# Patient Record
Sex: MALE | Race: AMERICAN INDIAN OR ALASKA NATIVE | NOT HISPANIC OR LATINO | Employment: UNEMPLOYED | URBAN - METROPOLITAN AREA
[De-identification: names, ages, dates, MRNs, and addresses within clinical notes are randomized per-mention and may not be internally consistent; named-entity substitution may affect disease eponyms.]

---

## 2023-04-07 ENCOUNTER — TRANSFERRED RECORDS (OUTPATIENT)
Dept: HEALTH INFORMATION MANAGEMENT | Facility: CLINIC | Age: 23
End: 2023-04-07

## 2023-04-07 LAB
ALT SERPL-CCNC: 63 U/L (ref 4–33)
AST SERPL-CCNC: 102 U/L (ref 13–39)
CREATININE (EXTERNAL): 0.9 MG/DL (ref 0.7–1.3)
GFR ESTIMATED (EXTERNAL): >=60 ML/MIN/1.73M2 (ref 60–130)
GLUCOSE (EXTERNAL): 114 MG/DL (ref 70–99)
PHQ9 SCORE: 27
POTASSIUM (EXTERNAL): 3.1 MMOL/L (ref 3.5–5.1)

## 2023-04-08 ENCOUNTER — HOSPITAL ENCOUNTER (INPATIENT)
Facility: HOSPITAL | Age: 23
LOS: 4 days | Discharge: HOME | End: 2023-04-12
Attending: STUDENT IN AN ORGANIZED HEALTH CARE EDUCATION/TRAINING PROGRAM | Admitting: STUDENT IN AN ORGANIZED HEALTH CARE EDUCATION/TRAINING PROGRAM
Payer: COMMERCIAL

## 2023-04-08 ENCOUNTER — TELEPHONE (OUTPATIENT)
Dept: PSYCHIATRY | Facility: OTHER | Age: 23
End: 2023-04-08

## 2023-04-08 DIAGNOSIS — F32.A DEPRESSION WITH SUICIDAL IDEATION: Primary | ICD-10-CM

## 2023-04-08 DIAGNOSIS — R45.851 DEPRESSION WITH SUICIDAL IDEATION: Primary | ICD-10-CM

## 2023-04-08 PROCEDURE — 250N000013 HC RX MED GY IP 250 OP 250 PS 637: Performed by: NURSE PRACTITIONER

## 2023-04-08 PROCEDURE — 124N000001 HC R&B MH

## 2023-04-08 PROCEDURE — HZ2ZZZZ DETOXIFICATION SERVICES FOR SUBSTANCE ABUSE TREATMENT: ICD-10-PCS | Performed by: NURSE PRACTITIONER

## 2023-04-08 RX ORDER — HALOPERIDOL 5 MG/ML
2.5-5 INJECTION INTRAMUSCULAR EVERY 6 HOURS PRN
Status: DISCONTINUED | OUTPATIENT
Start: 2023-04-08 | End: 2023-04-12 | Stop reason: HOSPADM

## 2023-04-08 RX ORDER — LORAZEPAM 2 MG/ML
1-2 INJECTION INTRAMUSCULAR EVERY 30 MIN PRN
Status: DISCONTINUED | OUTPATIENT
Start: 2023-04-08 | End: 2023-04-11

## 2023-04-08 RX ORDER — OLANZAPINE 5 MG/1
5-10 TABLET, ORALLY DISINTEGRATING ORAL EVERY 6 HOURS PRN
Status: DISCONTINUED | OUTPATIENT
Start: 2023-04-08 | End: 2023-04-12 | Stop reason: HOSPADM

## 2023-04-08 RX ORDER — MULTIPLE VITAMINS W/ MINERALS TAB 9MG-400MCG
1 TAB ORAL DAILY
Status: DISCONTINUED | OUTPATIENT
Start: 2023-04-08 | End: 2023-04-12 | Stop reason: HOSPADM

## 2023-04-08 RX ORDER — FLUMAZENIL 0.1 MG/ML
0.2 INJECTION, SOLUTION INTRAVENOUS
Status: DISCONTINUED | OUTPATIENT
Start: 2023-04-08 | End: 2023-04-12 | Stop reason: HOSPADM

## 2023-04-08 RX ORDER — GABAPENTIN 300 MG/1
300 CAPSULE ORAL EVERY 8 HOURS
Status: DISCONTINUED | OUTPATIENT
Start: 2023-04-14 | End: 2023-04-12 | Stop reason: HOSPADM

## 2023-04-08 RX ORDER — GABAPENTIN 600 MG/1
1200 TABLET ORAL ONCE
Status: COMPLETED | OUTPATIENT
Start: 2023-04-08 | End: 2023-04-08

## 2023-04-08 RX ORDER — GABAPENTIN 100 MG/1
100 CAPSULE ORAL EVERY 6 HOURS PRN
Status: DISCONTINUED | OUTPATIENT
Start: 2023-04-08 | End: 2023-04-12 | Stop reason: HOSPADM

## 2023-04-08 RX ORDER — GABAPENTIN 100 MG/1
100 CAPSULE ORAL EVERY 8 HOURS
Status: DISCONTINUED | OUTPATIENT
Start: 2023-04-16 | End: 2023-04-12 | Stop reason: HOSPADM

## 2023-04-08 RX ORDER — GABAPENTIN 300 MG/1
600 CAPSULE ORAL EVERY 8 HOURS
Status: DISCONTINUED | OUTPATIENT
Start: 2023-04-12 | End: 2023-04-12 | Stop reason: HOSPADM

## 2023-04-08 RX ORDER — MAGNESIUM HYDROXIDE/ALUMINUM HYDROXICE/SIMETHICONE 120; 1200; 1200 MG/30ML; MG/30ML; MG/30ML
30 SUSPENSION ORAL EVERY 4 HOURS PRN
Status: DISCONTINUED | OUTPATIENT
Start: 2023-04-08 | End: 2023-04-12 | Stop reason: HOSPADM

## 2023-04-08 RX ORDER — ACETAMINOPHEN 325 MG/1
650 TABLET ORAL EVERY 4 HOURS PRN
Status: DISCONTINUED | OUTPATIENT
Start: 2023-04-08 | End: 2023-04-12 | Stop reason: HOSPADM

## 2023-04-08 RX ORDER — CLONIDINE HYDROCHLORIDE 0.1 MG/1
0.1 TABLET ORAL EVERY 8 HOURS
Status: DISCONTINUED | OUTPATIENT
Start: 2023-04-08 | End: 2023-04-12 | Stop reason: HOSPADM

## 2023-04-08 RX ORDER — FOLIC ACID 1 MG/1
1 TABLET ORAL DAILY
Status: DISCONTINUED | OUTPATIENT
Start: 2023-04-08 | End: 2023-04-12 | Stop reason: HOSPADM

## 2023-04-08 RX ORDER — OLANZAPINE 10 MG/2ML
10 INJECTION, POWDER, FOR SOLUTION INTRAMUSCULAR 3 TIMES DAILY PRN
Status: DISCONTINUED | OUTPATIENT
Start: 2023-04-08 | End: 2023-04-12 | Stop reason: HOSPADM

## 2023-04-08 RX ORDER — GABAPENTIN 300 MG/1
900 CAPSULE ORAL EVERY 8 HOURS
Status: COMPLETED | OUTPATIENT
Start: 2023-04-09 | End: 2023-04-11

## 2023-04-08 RX ORDER — TRAZODONE HYDROCHLORIDE 50 MG/1
50 TABLET, FILM COATED ORAL
Status: DISCONTINUED | OUTPATIENT
Start: 2023-04-08 | End: 2023-04-12 | Stop reason: HOSPADM

## 2023-04-08 RX ORDER — OLANZAPINE 10 MG/1
10 TABLET ORAL 3 TIMES DAILY PRN
Status: DISCONTINUED | OUTPATIENT
Start: 2023-04-08 | End: 2023-04-12 | Stop reason: HOSPADM

## 2023-04-08 RX ORDER — LORAZEPAM 1 MG/1
1-2 TABLET ORAL EVERY 30 MIN PRN
Status: DISCONTINUED | OUTPATIENT
Start: 2023-04-08 | End: 2023-04-11

## 2023-04-08 RX ADMIN — CLONIDINE HYDROCHLORIDE 0.1 MG: 0.1 TABLET ORAL at 20:16

## 2023-04-08 RX ADMIN — GABAPENTIN 1200 MG: 600 TABLET ORAL at 20:16

## 2023-04-08 ASSESSMENT — ACTIVITIES OF DAILY LIVING (ADL)
CHANGE_IN_FUNCTIONAL_STATUS_SINCE_ONSET_OF_CURRENT_ILLNESS/INJURY: NO
DRESSING/BATHING_DIFFICULTY: NO
CONCENTRATING,_REMEMBERING_OR_MAKING_DECISIONS_DIFFICULTY: NO
DOING_ERRANDS_INDEPENDENTLY_DIFFICULTY: NO
DIFFICULTY_EATING/SWALLOWING: NO
ADLS_ACUITY_SCORE: 28
WEAR_GLASSES_OR_BLIND: NO
TOILETING_ISSUES: NO
FALL_HISTORY_WITHIN_LAST_SIX_MONTHS: NO
ADLS_ACUITY_SCORE: 28
WALKING_OR_CLIMBING_STAIRS_DIFFICULTY: NO
ADLS_ACUITY_SCORE: 45

## 2023-04-08 NOTE — TELEPHONE ENCOUNTER
1:58 PM - [facility] RN calling to report that Pt has been accepted to [facility].     1:59 PM - Indicia completed, Pt placed on PPS Admit Board

## 2023-04-08 NOTE — TELEPHONE ENCOUNTER
S: Outside Facility [place] [place], Charge IWONA Abarca calling at 1100.  22 year old/Male presenting with Suicidal ideation with plan    B: Pt arrived via police. Pt presents with Suicidal ideation with a plan to cut wrists and bleed to death in a bath.  Pt affect in ED: cooperative feeling sick  Pt Dx: Suicidal ETOH withdrawal  Previous IPMH hx? No  Pt endorses SI. Pt denies SIB.   Pt denies HI. Pt denies hallucinations.   Hx of suicide attempt? No  Hx of aggression, or current concerns for aggression this visit? No  Pt is prescribed medication. Pt is medication compliant  Pt denies OP services: yes  CD concerns: Yes ETOH  Acute medical concerns: Withdrawal  Does Pt present with any of the following: assistive devices, insulin pump, J/G tube, catheter, CPAP, continuous IV, continuous O2, bariatric needs, ADA needs? No  Is Pt their own guardian? Yes:   Pt is ambulatory  Pt is  able to perform ADLs independently    A: Pt meets criteria for review for IP admission. Patient on a 72-hour hold.   COVID: Negative  Utox: Negative  CMP: Abnormalities: Low K+  CBC: WNL  HCG: Negative    R: Patient accepted for behavioral bed placement: Yes        Accepted by Provider Miryam Rodriguez NP    Admission to Inpatient Level of Care is indicated due to:     1. Patient risk of severity of behavioral health disorder is appropriate to proposed level of care as indicated by:   a. Imminent risk of harm to self Yes describe Suicide with plan  b. Imminent risk of harm to others No describe   And/or  Behavioral health disorder is present and appropriate for inpatient care with both of the following:   a.  Severe psychiatric, behavioral or other comorbid conditions: No describe   b.  Severe dysfunction in daily living is present: No describe   2.  Inpatient mental health services are necessary to meet patient needs based on:   a. Specific condition related to admission diagnosis is present and will likely improve with treatment at an  inpatient level of care: Yes  b. Specific condition related to admission diagnosis will likely deteriorate in the absence of treatment at an inpatient level of care: Yes    3.  Situation and expectations are appropriate for inpatient care, as indicated by  one of the following:     A. Patient is unwilling to participate in treatment voluntarily and requires treatment. Yes   B. Is voluntary treatment at lower level of care feasible No  C. Around the clock medical and nursing care is for symptoms is required: Yes  D. Patient management at lower level of care is not appropriate and  biopsychosocial stressors may be contributing to clinical presentation. Yes

## 2023-04-09 PROCEDURE — 124N000001 HC R&B MH

## 2023-04-09 PROCEDURE — 99223 1ST HOSP IP/OBS HIGH 75: CPT | Mod: AI | Performed by: NURSE PRACTITIONER

## 2023-04-09 PROCEDURE — 250N000013 HC RX MED GY IP 250 OP 250 PS 637: Performed by: NURSE PRACTITIONER

## 2023-04-09 RX ORDER — VITAMIN B COMPLEX
50 TABLET ORAL DAILY
Status: DISCONTINUED | OUTPATIENT
Start: 2023-04-09 | End: 2023-04-12 | Stop reason: HOSPADM

## 2023-04-09 RX ORDER — LANOLIN ALCOHOL/MO/W.PET/CERES
3 CREAM (GRAM) TOPICAL
Status: DISCONTINUED | OUTPATIENT
Start: 2023-04-09 | End: 2023-04-12 | Stop reason: HOSPADM

## 2023-04-09 RX ORDER — CHLORAL HYDRATE 500 MG
1 CAPSULE ORAL DAILY
Status: DISCONTINUED | OUTPATIENT
Start: 2023-04-09 | End: 2023-04-12 | Stop reason: HOSPADM

## 2023-04-09 RX ADMIN — ACETAMINOPHEN 650 MG: 325 TABLET ORAL at 13:56

## 2023-04-09 RX ADMIN — LORAZEPAM 2 MG: 1 TABLET ORAL at 06:11

## 2023-04-09 RX ADMIN — GABAPENTIN 900 MG: 300 CAPSULE ORAL at 06:08

## 2023-04-09 RX ADMIN — Medication 100 MG: at 08:32

## 2023-04-09 RX ADMIN — CLONIDINE HYDROCHLORIDE 0.1 MG: 0.1 TABLET ORAL at 06:08

## 2023-04-09 RX ADMIN — CLONIDINE HYDROCHLORIDE 0.1 MG: 0.1 TABLET ORAL at 20:14

## 2023-04-09 RX ADMIN — GABAPENTIN 900 MG: 300 CAPSULE ORAL at 19:21

## 2023-04-09 RX ADMIN — FOLIC ACID 1 MG: 1 TABLET ORAL at 08:32

## 2023-04-09 RX ADMIN — GABAPENTIN 900 MG: 300 CAPSULE ORAL at 13:54

## 2023-04-09 RX ADMIN — LORAZEPAM 1 MG: 1 TABLET ORAL at 13:57

## 2023-04-09 RX ADMIN — CLONIDINE HYDROCHLORIDE 0.1 MG: 0.1 TABLET ORAL at 13:54

## 2023-04-09 RX ADMIN — MULTIPLE VITAMINS W/ MINERALS TAB 1 TABLET: TAB at 08:32

## 2023-04-09 ASSESSMENT — ACTIVITIES OF DAILY LIVING (ADL)
LAUNDRY: UNABLE TO COMPLETE
HYGIENE/GROOMING: INDEPENDENT
ADLS_ACUITY_SCORE: 28
LAUNDRY: UNABLE TO COMPLETE
ORAL_HYGIENE: INDEPENDENT
DRESS: SCRUBS (BEHAVIORAL HEALTH);INDEPENDENT
DRESS: INDEPENDENT;SCRUBS (BEHAVIORAL HEALTH)
ORAL_HYGIENE: INDEPENDENT
ADLS_ACUITY_SCORE: 28
HYGIENE/GROOMING: INDEPENDENT
ADLS_ACUITY_SCORE: 28

## 2023-04-09 NOTE — H&P
"[facility]   HISTORY AND PHYSICAL     ADMISSION DATA     Farhan Drake MRN# [id number]   Age: 22 year old YOB: 2000     Date of Admission: 4/8/2023  Primary Physician: No Ref-Primary, Physician        CHIEF COMPLAINT   \"I relapsed and was suicidal\"       HISTORY OF PRESENT ILLNESS    Farhan is a 21 yo  male who was admitted with MDD and suicidal ideation. Upon meeting with pt this am he reports he is no longer suicidal attributing this behavior to his relapse on ETOH. Pt notes having a 3 day binge, he had been sober since November. Discussed medications often used for decreased cravings such as Campral, naltrexone, or Vivitrol. Pt declines offer to start on any medications. Pt admits to feelings of depression which began in November. Discussed treatment for his mood which he again declined. I did remind him we are available if he would like to discuss medication. Pt desires to return to treatment.             PSYCHIATRIC HISTORY       Pt denies     SUBSTANCE USE HISTORY   History   Drug Use Not on file       Social History    Substance and Sexual Activity      Alcohol use: Not on file      History   Smoking Status     Not on file   Smokeless Tobacco     Not on file   Admits to ETOH abuse. Has been inpatient 3 times and outpatient 3 times.       SOCIAL HISTORY   Social History     Socioeconomic History     Marital status: Single     Spouse name: Not on file     Number of children: Not on file     Years of education: Not on file     Highest education level: Not on file   Occupational History     Not on file   Tobacco Use     Smoking status: Not on file     Smokeless tobacco: Not on file   Substance and Sexual Activity     Alcohol use: Not on file     Drug use: Not on file     Sexual activity: Not on file   Other Topics Concern     Not on file   Social History Narrative     Not on file     Social Determinants of Health     Financial Resource Strain: Not on file   Food " "Insecurity: Not on file   Transportation Needs: Not on file   Physical Activity: Not on file   Stress: Not on file   Social Connections: Not on file   Intimate Partner Violence: Not on file   Housing Stability: Not on file   single. Girlfriend broke up with him in November       FAMILY HISTORY   No family history on file.      PAST MEDICAL HISTORY   No past medical history on file.    No past surgical history on file.    Patient has no known allergies.     MEDICATIONS   Prior to Admission medications    Not on File        PHYSICAL EXAM/ROS     I have reviewed the physical exam as documented by Karla Mabry agree with findings and assessment and have no additional findings to add at this time. The review of systems is negative other than noted in the HPI.       LABS   No results found for this or any previous visit (from the past 24 hour(s)).      MENTAL STATUS EXAM   Vitals: /82   Pulse 76   Temp 97.6  F (36.4  C) (Temporal)   Resp 16   Ht 1.778 m (5' 10\")   Wt 108 kg (238 lb 1.6 oz)   SpO2 97%   BMI 34.16 kg/m      Appearance:  awake, alert, adequately groomed, dressed in hospital scrubs and appeared as age stated  Attitude:  cooperative  Eye Contact:  fair  Mood:  sad   Affect:  intensity is blunted  Speech:  clear, coherent  Psychomotor Behavior:  no evidence of tardive dyskinesia, dystonia, or tics and intact station, gait and muscle tone  Thought Process:  logical, linear and goal oriented  Associations:  no loose associations  Thought Content:  no evidence of suicidal ideation or homicidal ideation and no evidence of psychotic thought  Insight:  fair  Judgment:  fair  Oriented to:  time, person, and place  Attention Span and Concentration:  intact  Recent and Remote Memory:  intact  Language: Able to name objects, Able to repeat phrases and Able to read and write  Fund of Knowledge: appropriate  Muscle Strength and Tone: normal  Gait and Station: Normal       ASSESSMENT     This is a 22 " year old male with a PMH of alcohol use disorder-severe who was admitted for suicidal ideation following a 3 day binge after leaving treatment. Pt adamantly denies suicidal ideation today. He is not interested in treatment for his mood or craving for ETOH rather desires returning to treatment. Discussed it is unknown today if this is even a possibility however tomorrow his assigned SW maybe able to look into this for him.        DIAGNOSIS     1.alcohol use disorder, severe  2.suicidal ideation  3.major depression       PLAN     Location: Unit 5  Legal Status: Orders Placed This Encounter      Legal status 72 Hour Hold    Safety Assessment:    Behavioral Orders   Procedures     Code 1 - Restrict to Unit     Routine Programming     As clinically indicated     Status 15     Every 15 minutes.      PTA psychotropic medications held:   none    PTA psychotropic medications continued/changed:   none    New medications initiated:   May utilize standing orders as needed.    Programming: Patient will be treated in a therapeutic milieu with appropriate individual and group therapies. Education will be provided on diagnoses, medications, and treatments.     Medical diagnoses:  Per medicine    Consult: none  Tests: none    Anticipated LOS: 3-4 days  Disposition: to be determined=preferably CD treatment    Justification for hospitalization: reasons for hospitalization include potential safety risk to self or others within the last week, decreased functioning in outpatient setting and in the setting of no outpatient management, need for highly structured inpatient management for stabilization of psychiatric symptoms, need for psychiatric medication initiation and stabilization.       ATTESTATION      RAQUEL Mendenhall CNP
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
## 2023-04-09 NOTE — PLAN OF CARE
"SHIFT SUMMARY:  16:30 and 20:30 CIWA scores were below parameters for medication administration. Calm and cooperative, stating \"I need to get help. I want it.\" Denies suicidal ideation, hallucinations and pain. Eating at least 50% of meals. Compliant with medication administration and treatment team recommendations. Free of self-harm and/or injury.      Problem: Adult Behavioral Health Plan of Care  Goal: Patient-Specific Goal (Individualization)  Description: Patient will sleep 6 to 8 hours per night  Patient will eat at least 50% of meals  Patient will attend at least 50% of groups  Patient will comply with recommendations of treatment team  Patient will remain medication compliant  Outcome: Progressing     Problem: Suicide Risk  Goal: Absence of Self-Harm  Description: Pt will be free of self harm and injury while on unit.   Outcome: Progressing   Goal Outcome Evaluation:                        "

## 2023-04-09 NOTE — PLAN OF CARE
Problem: Adult Behavioral Health Plan of Care  Goal: Patient-Specific Goal (Individualization)  Description: Patient will sleep 6 to 8 hours per night  Patient will eat at least 50% of meals  Patient will attend at least 50% of groups  Patient will comply with recommendations of treatment team  Patient will remain medication compliant  Outcome: Progressing   Patient in bed sleeping until 0600.  Reported feeling withdrawal symptoms and scored a 15 on the CIWA scale.  Patient was given 2 mg Ativan at 0611.  Patient was back to sleep on recheck at 0650.  VS WNL.  Will continue to monitor.  Face to face end of shift report communicated to day shift RN.     Ellie Ellison, RN  4/9/2023  6:52 AM

## 2023-04-09 NOTE — PLAN OF CARE
Problem: Suicide Risk  Goal: Absence of Self-Harm  Description: Pt will be free of self harm and injury while on unit.   Intervention: Promote Psychosocial Wellbeing  Recent Flowsheet Documentation  Taken 4/9/2023 1300 by Tevin Alexander RN  Family/Support System Care:    support provided    involvement promoted    Pt feels depressed, guilty, and currently denies SI     Problem: Adult Behavioral Health Plan of Care  Goal: Patient-Specific Goal (Individualization)  Description: Patient will sleep 6 to 8 hours per night  Patient will eat at least 50% of meals  Patient will attend at least 50% of groups  Patient will comply with recommendations of treatment team  Patient will remain medication compliant  Outcome: Progressing   Goal Outcome Evaluation:    Plan of Care Reviewed With: patient      0730 Face to face rounding complete.  Pt introduced to nursing for the shift.    Pt was up for breakfast eating about 75%.  He had no withdrawal symptoms.  He went back to bed until lunch time. He did score a 15 after lunch and was given 1 mg of Ativan. He talked with me about his struggle with depression going on for about four years. He told me that Prozac made him feel like a Zombie in the past. He is interested in diet and exercise to help with his depression.  He was given some teaching on a low inflammation diet.  He asked about vitamin D and is interested in Fish oil.  This were started.  He told me that he has been struggling with sleep when he ws at home.  He is interested in going to a  based treatment program and was given information from the web about [employer] [facility] and [facility].  He is also interested in a [place] college and was given information on [place].  Pt is not interested in taking medications for his depression somewhat due to cultural considerations.  Pt's affect is sad, flat, anxious and depressed.   He told me that he is feeling ill and achy. He was also given Tylenol for generalized  body aches.      1500 Face to face end of shift report communicated to Evening Shift RN's along with Pt's fall risk..     Tevin Alexander RN  4/9/2023  3:13 PM

## 2023-04-09 NOTE — PROGRESS NOTES
04/08/23 2013   Patient Belongings   Did you bring any home meds/supplements to the hospital?  No   Patient Belongings locker;sent to security per site process   Patient Belongings Put in Hospital Secure Location (Security or Locker, etc.) cell phone/electronics;clothing;money (see comment);shoes;wallet  (Cash 100 (7))   Belongings Search Yes   Clothing Search Yes   Second Staff Ellie RN   Comment Yoon shoes, snacks, avera cup, white shoes, light green scrubs, x5 boxers, black hat, silver reflective jacket, black duffle, tan work boots, plastic bag w/ , tooth paste, tooth brush, x5 shirts, x7 pants, x2 sweaters, x4 pairs socks     List items sent to safe: grey Iphone in green case w/ some scratches, Black wallet, picture, [place] DL, [place] member ID, unlimited debit card, [employer] card, estebo, sole and munshower card, aver PT rights, cash (7) 100 dollar bills    All other belongings put in assigned cubby in belongings room.       I have reviewed my belongings list on admission and verify that it is correct.     Patient signature_______________________________    Second staff witness (if patient unable to sign) ______________________________       I have received all my belongings at discharge.    Patient signature________________________________    Saima  4/8/2023  8:17 PM

## 2023-04-10 PROCEDURE — 124N000001 HC R&B MH

## 2023-04-10 PROCEDURE — 250N000013 HC RX MED GY IP 250 OP 250 PS 637: Performed by: NURSE PRACTITIONER

## 2023-04-10 PROCEDURE — 99233 SBSQ HOSP IP/OBS HIGH 50: CPT | Mod: 95 | Performed by: PSYCHIATRY & NEUROLOGY

## 2023-04-10 RX ORDER — TRAZODONE HYDROCHLORIDE 50 MG/1
50 TABLET, FILM COATED ORAL
Status: DISCONTINUED | OUTPATIENT
Start: 2023-04-10 | End: 2023-04-12 | Stop reason: HOSPADM

## 2023-04-10 RX ADMIN — Medication 50 MCG: at 08:40

## 2023-04-10 RX ADMIN — ACETAMINOPHEN 650 MG: 325 TABLET ORAL at 11:12

## 2023-04-10 RX ADMIN — GABAPENTIN 900 MG: 300 CAPSULE ORAL at 04:02

## 2023-04-10 RX ADMIN — FOLIC ACID 1 MG: 1 TABLET ORAL at 08:40

## 2023-04-10 RX ADMIN — CLONIDINE HYDROCHLORIDE 0.1 MG: 0.1 TABLET ORAL at 04:02

## 2023-04-10 RX ADMIN — GABAPENTIN 900 MG: 300 CAPSULE ORAL at 18:32

## 2023-04-10 RX ADMIN — MULTIPLE VITAMINS W/ MINERALS TAB 1 TABLET: TAB at 08:40

## 2023-04-10 RX ADMIN — TRAZODONE HYDROCHLORIDE 50 MG: 50 TABLET ORAL at 21:47

## 2023-04-10 RX ADMIN — LORAZEPAM 1 MG: 1 TABLET ORAL at 11:12

## 2023-04-10 RX ADMIN — OMEGA-3 FATTY ACIDS CAP 1000 MG 1 G: 1000 CAP at 08:40

## 2023-04-10 RX ADMIN — GABAPENTIN 900 MG: 300 CAPSULE ORAL at 11:09

## 2023-04-10 RX ADMIN — MELATONIN 3 MG: at 21:47

## 2023-04-10 RX ADMIN — CLONIDINE HYDROCHLORIDE 0.1 MG: 0.1 TABLET ORAL at 21:47

## 2023-04-10 RX ADMIN — CLONIDINE HYDROCHLORIDE 0.1 MG: 0.1 TABLET ORAL at 12:51

## 2023-04-10 RX ADMIN — Medication 100 MG: at 08:40

## 2023-04-10 ASSESSMENT — ACTIVITIES OF DAILY LIVING (ADL)
HYGIENE/GROOMING: INDEPENDENT
ADLS_ACUITY_SCORE: 28
HYGIENE/GROOMING: INDEPENDENT
ORAL_HYGIENE: INDEPENDENT
DRESS: INDEPENDENT
DRESS: SCRUBS (BEHAVIORAL HEALTH);INDEPENDENT
ADLS_ACUITY_SCORE: 28
LAUNDRY: UNABLE TO COMPLETE
ORAL_HYGIENE: INDEPENDENT
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
## 2023-04-10 NOTE — PLAN OF CARE
"Face to face shift report received from Ellie POWER RN. Rounding completed, pt observed.    Problem: Adult Behavioral Health Plan of Care  Goal: Patient-Specific Goal (Individualization)  Description: Patient will sleep 6 to 8 hours per night  Patient will eat at least 50% of meals  Patient will attend at least 50% of groups  Patient will comply with recommendations of treatment team  Patient will remain medication compliant  Outcome: Progressing  Note: Shift Summary:   [facility] the start of this shift.  Patient denies current suicidal ideation or intent.  Appetite is \"okay\".  Compliant with medications.  Mild tremors noted at start of this shift.  CIWA-Ar scores this shift 2 at start of shift, 10 at 1100 so administered Ativan 1 mg po along with Tylenol 650 mg po at 1112 as complained of pain in right side of chest radiating down right arm.  VSS..Recheck of CIWA-Ar 3.  Pain in right arm/aside resolved.  Oriented x4,  Gait is balanced and steady.     Problem: Suicide Risk  Goal: Absence of Self-Harm  Description: Pt will be free of self harm and injury while on unit.   Outcome: Progressing     Problem: Alcohol Withdrawal  Goal: Alcohol Withdrawal Symptom Control  Description: Patient will have a safe withdrawal from alcohol.  Outcome: Progressing  Face to face report will be communicated to oncoming RN.    Jaimee Mondragon RN  4/10/2023                            "

## 2023-04-10 NOTE — DISCHARGE INSTRUCTIONS
Behavioral Discharge Planning and Instructions    Summary: 22 yr old  male who was admitted with MDD and SI with plan to cut wrists. 3 day binge on alcohol after being sober since November.    Main Diagnosis: 1.alcohol use disorder, severe  2.suicidal ideation  3.major depression    Health Care Follow-up:     [id number]  [address], [place] [address]  [phone]       [email or web address]  [address] [place] [address]    Attend all scheduled appointments with your outpatient providers. Call at least 24 hours in advance if you need to reschedule an appointment to ensure continued access to your outpatient providers.     Major Treatments, Procedures and Findings:  You were provided with: a psychiatric assessment, assessed for medical stability, medication evaluation and/or management, group therapy, family therapy, individual therapy, CD evaluation/assessment, milieu management, and medical interventions    Symptoms to Report: feeling more aggressive, increased confusion, losing more sleep, mood getting worse, or thoughts of suicide    Early warning signs can include: increased depression or anxiety sleep disturbances increased thoughts or behaviors of suicide or self-harm  increased unusual thinking, such as paranoia or hearing voices    Safety and Wellness:  Take all medicines as directed.  Make no changes unless your doctor suggests them.      Follow treatment recommendations.  Refrain from alcohol and non-prescribed drugs.  Ask your support system to help you reduce your access to items that could harm yourself or others. Items could include:  Firearms  Medicines (both prescribed and over-the-counter)  Knives and other sharp objects  Ropes and like materials  Car keys  If there is a concern for safety, call 911. If there is a concern for safety, call 911.    Resources:   Crisis Intervention: 935.435.3073 or 931-961-6186 (TTY: 131.999.8548).  " Call anytime for help.  National [place] on Mental Illness (www.[place].lindy.org): 802.410.5461 or 911-382-0515.  [place] Association for Children's Mental Health (www.mac.org): 663.125.5722.  Alcoholics Anonymous (www.alcoholics-anonymous.org): Check your phone book for your local chapter.  Suicide Awareness Voices of Education (SAVE) (www.save.org): 033-323-GYYD (0694)  National Suicide Prevention Line (www.mentalhealthmn.org): 122-981-RVOA (4218)  Mental Health Consumer/Survivor Network of [place] (www.mhcsn.net): 714.327.3080 or 404-585-9777  Mental Health Association of [place] (www.mentalhealth.org): 954.211.5193 or 000-466-4034  Self- Management and Recovery Training., SMART-- Toll free: 555.541.6864  www.[employer]  Text 4 Life: txt \"LIFE\" to 45179 for immediate support and crisis intervention  Crisis text line: Text \"[place]\" to 788335. Free, confidential, 24/7.  Crisis Intervention: 940.424.6468 or 319-447-1625. Call anytime for help.     General Medication Instructions:   See your medication sheet(s) for instructions.   Take all medicines as directed.  Make no changes unless your doctor suggests them.   Go to all your doctor visits.  Be sure to have all your required lab tests. This way, your medicines can be refilled on time.  Do not use any drugs not prescribed by your doctor.  Avoid alcohol.    Advance Directives:   Scanned document on file with [employer]? No scanned doc  Is document scanned? Pt states no documents  Honoring Choices Your Rights Handout: Informed and given  Was more information offered? Pt declined    The Treatment team has appreciated the opportunity to work with you. If you have any questions or concerns about your recent admission, you can contact the unit which can receive your call 24 hours a day, 7 days a week. They will be able to get in touch with a Provider if needed. The unit number is [phone] .  "

## 2023-04-10 NOTE — PROGRESS NOTES
"CLINICAL NUTRITION SERVICES  -  ASSESSMENT NOTE    Farhan Drake : Provider Order - alcohol withdrawal    22 yom admitted for depression with suicidal ideation. Limited medical hx available. No weight hx available. Nursing nutrition screen negative. Adequate intake so far. Multivitamin, thiamine and folic acid ordered - alcohol withdrawal.  Fish oil, and vitamin d also ordered- has not been given.    Diet Order: Regular  Intake: 4 meals with 100% intake    Height: 5' 10\"  Weight: 238 lbs 1.6 oz  Body mass index is 34.16 kg/m .  Weight Status:  Obesity Grade I BMI 30-34.9  Weight History:   Wt Readings from Last 10 Encounters:   04/08/23 108 kg (238 lb 1.6 oz)      Malnutrition Diagnosis: Unable to determine. None suspected at this time    NUTRITION RECOMMENDATIONS  Continue with current nutrition interventions- multivitamin, thiamine and folic acid    MONITORING AND EVALUATION:  RD will monitor intake, weight, labs as needed                "

## 2023-04-10 NOTE — PLAN OF CARE
Problem: Adult Behavioral Health Plan of Care  Goal: Patient-Specific Goal (Individualization)  Description: Patient will sleep 6 to 8 hours per night  Patient will eat at least 50% of meals  Patient will attend at least 50% of groups  Patient will comply with recommendations of treatment team  Patient will remain medication compliant  Outcome: Progressing       Patient in bed asleep all shift.  Woke him at 0400 for scheduled gabapentin and clonidine.  No signs of active withdrawal at this time Scored 0s on the CIWA scale during the night. Face to face end of shift report communicated to day shift RN.     Ellie Ellison RN  4/10/2023  6:26 AM

## 2023-04-11 PROCEDURE — 99232 SBSQ HOSP IP/OBS MODERATE 35: CPT | Mod: 95 | Performed by: PSYCHIATRY & NEUROLOGY

## 2023-04-11 PROCEDURE — 124N000001 HC R&B MH

## 2023-04-11 PROCEDURE — 250N000013 HC RX MED GY IP 250 OP 250 PS 637: Performed by: NURSE PRACTITIONER

## 2023-04-11 RX ORDER — TRAZODONE HYDROCHLORIDE 50 MG/1
50 TABLET, FILM COATED ORAL
Qty: 30 TABLET | Refills: 0 | Status: SHIPPED | OUTPATIENT
Start: 2023-04-11 | End: 2023-05-11

## 2023-04-11 RX ORDER — PROPRANOLOL HYDROCHLORIDE 20 MG/1
20 TABLET ORAL 3 TIMES DAILY PRN
COMMUNITY
Start: 2023-03-09

## 2023-04-11 RX ADMIN — CLONIDINE HYDROCHLORIDE 0.1 MG: 0.1 TABLET ORAL at 05:50

## 2023-04-11 RX ADMIN — GABAPENTIN 900 MG: 300 CAPSULE ORAL at 03:32

## 2023-04-11 RX ADMIN — OMEGA-3 FATTY ACIDS CAP 1000 MG 1 G: 1000 CAP at 08:38

## 2023-04-11 RX ADMIN — GABAPENTIN 900 MG: 300 CAPSULE ORAL at 19:24

## 2023-04-11 RX ADMIN — LORAZEPAM 1 MG: 1 TABLET ORAL at 08:38

## 2023-04-11 RX ADMIN — MULTIPLE VITAMINS W/ MINERALS TAB 1 TABLET: TAB at 08:38

## 2023-04-11 RX ADMIN — GABAPENTIN 900 MG: 300 CAPSULE ORAL at 11:26

## 2023-04-11 RX ADMIN — CLONIDINE HYDROCHLORIDE 0.1 MG: 0.1 TABLET ORAL at 13:14

## 2023-04-11 RX ADMIN — FOLIC ACID 1 MG: 1 TABLET ORAL at 08:38

## 2023-04-11 RX ADMIN — MELATONIN 3 MG: at 20:18

## 2023-04-11 RX ADMIN — Medication 100 MG: at 08:38

## 2023-04-11 RX ADMIN — TRAZODONE HYDROCHLORIDE 50 MG: 50 TABLET ORAL at 20:18

## 2023-04-11 RX ADMIN — Medication 50 MCG: at 08:38

## 2023-04-11 RX ADMIN — CLONIDINE HYDROCHLORIDE 0.1 MG: 0.1 TABLET ORAL at 20:18

## 2023-04-11 ASSESSMENT — ACTIVITIES OF DAILY LIVING (ADL)
ADLS_ACUITY_SCORE: 28
HYGIENE/GROOMING: INDEPENDENT
ADLS_ACUITY_SCORE: 28
DRESS: INDEPENDENT
ADLS_ACUITY_SCORE: 28
ORAL_HYGIENE: INDEPENDENT
ADLS_ACUITY_SCORE: 28

## 2023-04-11 NOTE — PLAN OF CARE
does not have insurance for ride and [place] bus line does not go to [place] from [place]. [employer] will provide ride at 8am 4/12.

## 2023-04-11 NOTE — PLAN OF CARE
SHIFT SUMMARY:  Scoring below parameters on CIWA scale for medication administration. Denies suicidal ideation, hallucinations and pain. Sociable with peers and staff. Free of self-harm. Eating at least 50% of meals. Compliant with recommendations of treatment team and medication administration.       Problem: Adult Behavioral Health Plan of Care  Goal: Patient-Specific Goal (Individualization)  Description: Patient will sleep 6 to 8 hours per night  Patient will eat at least 50% of meals  Patient will attend at least 50% of groups  Patient will comply with recommendations of treatment team  Patient will remain medication compliant  Outcome: Progressing     Problem: Suicide Risk  Goal: Absence of Self-Harm  Description: Pt will be free of self harm and injury while on unit.   Outcome: Progressing     Problem: Alcohol Withdrawal  Goal: Alcohol Withdrawal Symptom Control  Description: Patient will have a safe withdrawal from alcohol.  Outcome: Progressing     Problem: Suicidal Behavior  Goal: Suicidal Behavior is Absent or Managed  Outcome: Progressing   oal Outcome Evaluation:

## 2023-04-11 NOTE — PLAN OF CARE
"Face to face shift report received from Hanane DURAN RN. Rounding completed, pt observed.    Problem: Adult Behavioral Health Plan of Care  Goal: Patient-Specific Goal (Individualization)  Description: Patient will sleep 6 to 8 hours per night  Patient will eat at least 50% of meals  Patient will attend at least 50% of groups  Patient will comply with recommendations of treatment team  Patient will remain medication compliant  Outcome: Progressing  Note: Shift Summary:   Patient has been up on the unit this shift.  Denies current suicidal ideation or intent. Patient scored 8 on CIWA-Ar first thing this shift r/t mild tremors and anxiety.  Ativan 1 mg po administered at 0838.  Patient to groups today.  States he will be discharging back home tomorrow with plans to \"go to treatment once I get some things taken care of\".  Patient is steady on feet and can make his needs known to staff.     Problem: Suicide Risk  Goal: Absence of Self-Harm  Description: Pt will be free of self harm and injury while on unit.   Outcome: Progressing     Problem: Alcohol Withdrawal  Goal: Alcohol Withdrawal Symptom Control  Description: Patient will have a safe withdrawal from alcohol.  Outcome: Met  Face to face report will be communicated to oncoming RN.    Jaimee Mondragon RN  4/11/2023                      "

## 2023-04-11 NOTE — PROGRESS NOTES
"    [facility] PSYCHIATRY  -  PROGRESS NOTE     ID   Name: Farhan Drake  MRN#: [id number]     SUBJECTIVE   Prior to interviewing the patient, I met with nursing and reviewed patient's clinical condition. We discussed clinical care both before and after the interview. I have reviewed the patient's clinical course by review of records including previous notes, labs, and vital signs.     Per nursing, the patient had the following behavioral events over the last 24-hours: none    On psychiatric interview, he is met within the milieu. He states he is \"fine\". Reports he wants to go to treatment but not right away as he has \"business\" to take care of outside the hospital. Explained again this puts him at high risk for relapse. He denies SI. He denies physical pain. He is aware that his hold expires tomorrow. He did state he slept well with the trazodone last night and wishes to continue. Will plan to dismiss tomorrow after his hold is up.        MEDICATIONS   Scheduled Meds:    cloNIDine  0.1 mg Oral Q8H     fish oil-omega-3 fatty acids  1 g Oral Daily     folic acid  1 mg Oral Daily     [START ON 4/16/2023] gabapentin  100 mg Oral Q8H     [START ON 4/14/2023] gabapentin  300 mg Oral Q8H     [START ON 4/12/2023] gabapentin  600 mg Oral Q8H     gabapentin  900 mg Oral Q8H     multivitamin w/minerals  1 tablet Oral Daily     thiamine  100 mg Oral Daily     cholecalciferol  50 mcg Oral Daily     PRN Meds:.acetaminophen, alum & mag hydroxide-simethicone, flumazenil, gabapentin, OLANZapine zydis **OR** haloperidol lactate, melatonin, OLANZapine **OR** OLANZapine, traZODone, traZODone     ALLERGIES   No Known Allergies     VITALS   Vitals: /62   Pulse 62   Temp 97.6  F (36.4  C) (Temporal)   Resp 14   Ht 1.778 m (5' 10\")   Wt 108 kg (238 lb 1.6 oz)   SpO2 96%   BMI 34.16 kg/m       MENTAL STATUS EXAM   Farhan Drake is an age appearing 22 year old  male.  Grooming and hygiene are " "appropriate. Kinetics are calm, avoidant eye contact. Superficially Engaged in interview. Concentration is intact to conversation, cognition and intellectual functioning are intact. Mood is still \"fine\". Affect is subdued. Speech is within normal limits for volume, rate and tone. Thought process is logical, linear, and goal-directed. Does not endorse auditory/visual hallucinations and/or delusions. Does not appear to respond to internal stimuli.  Judgement and insight are poor.  Does not endorse suicidal ideation.  Does not endorse homicidal ideation. Gait and station are within normal limits.       LABS   No results found for this or any previous visit (from the past 24 hour(s)).      ASSESSMENT    Farhan is a 23 yo  male who was admitted with MDD and suicidal ideation. Upon meeting with pt this am he reports he is no longer suicidal attributing this behavior to his relapse on ETOH. Pt notes having a 3 day binge, he had been sober since November. Discussed medications often used for decreased cravings such as Campral, naltrexone, or Vivitrol. Pt declines offer to start on any medications. Pt admits to feelings of depression which began in November. Discussed treatment for his mood which he again declined. I did remind him we are available if he would like to discuss medication. Pt desires to return to treatment.    Daily Progress:  Continues with limited insight into what he will need for recovery. Will attempt to get him connected to CD treatment programs but ultimately unwilling to go in the near future. Slept well with trazodone last night and will continue. Anticipate dismissal at resolution of hold tomorrow.      DIAGNOSTIC FORMULATION      1.alcohol use disorder, severe  2.suicidal ideation  3.major depression     PLAN     Location: Unit 5  Legal Status: Orders Placed This Encounter      Legal status 72 Hour Hold    Safety Assessment:    Behavioral Orders   Procedures     Code 1 - Restrict to " Unit     Routine Programming     As clinically indicated     Status 15     Every 15 minutes.        PTA psychotropic medications held:   none     PTA psychotropic medications continued/changed:   none     New medications initiated:   May utilize standing orders as needed.    Today's Changes:  - continue trazodone 50 mg PRN at bedtime     Programming: Patient will be treated in a therapeutic milieu with appropriate individual and group therapies. Education will be provided on diagnoses, medications, and treatments. Encouraged behavioral activation and participation in group programming.     Medical diagnoses:  Per medicine    Disposition: pending clinical course        TREATMENT TEAM CARE PLAN     Progress: Continued symptoms.    Continued Stay Criteria/Rationale: Continued symptoms without sufficient improvement/resolution.    Medical/Physical: See above.    Precautions: See above.     Plan: Continue inpatient care with unit support and medication management.    Rationale for change in precautions or plan: NA due to no change.    Participants: Jae Patino MD, Nursing, SW, OT.    The patient's care was discussed with the treatment team and chart notes were reviewed.       ATTESTATION    Jae Patino MD  [facility]   Psychiatry    Video Visit: Patient has given verbal consent for video visit?: Yes  Type of Service: video visit for mental health treatment  Reason for Video Visit: COVID-19 [place]  Originating Site (patient location): [facility]  Distant Site (provider location): Remote Location  Mode of Communication: Video Conference via [employer]  Time of Service: Date: 04/11/2023 , Start: 09:00 end: 09:30

## 2023-04-11 NOTE — MEDICATION SCRIBE - ADMISSION MEDICATION HISTORY
Admission Medication History    Admission medication history is complete. The information provided in this note is only as accurate as the sources available at the time of the update.    Medication reconciliation/reorder completed by provider prior to medication history? yes    Information Source(s):     Patient interview    Medication dispense hx    Pertinent Information: none    Changes made to PTA medication list:    Added: propranolol    Deleted: none    Changed: none    Medication Affordability: did not review       Allergies reviewed with patient and updates made in EHR: NKDA    Medication History Completed By: Lindsey Kirkpatrick 4/11/2023 3:18 PM    PTA Med List   Medication Sig Last Dose     traZODone (DESYREL) 50 MG tablet Take 1 tablet (50 mg) by mouth nightly as needed for sleep (may repeat after 60 minutes)

## 2023-04-11 NOTE — PLAN OF CARE
Face to face report received from Cherelle BUSTILLO. Pt. Observed.     Problem: Adult Behavioral Health Plan of Care  Goal: Patient-Specific Goal (Individualization)  Description: Patient will sleep 6 to 8 hours per night  Patient will eat at least 50% of meals  Patient will attend at least 50% of groups  Patient will comply with recommendations of treatment team  Patient will remain medication compliant  Outcome: Progressing   Pt has been in bed with eyes closed and regular respirations x 6.75 hours this noc shift. 15 minute and PRN checks all night. No complaints offered. Will continue to monitor.    Problem: Alcohol Withdrawal  Goal: Alcohol Withdrawal Symptom Control  Description: Patient will have a safe withdrawal from alcohol.  Outcome: Progressing     Face to face end of shift report communicated to oncoming RN.     Hanane Kaminski RN  4/11/2023

## 2023-04-11 NOTE — DISCHARGE SUMMARY
[facility] PSYCHIATRY  DISCHARGE SUMMARY     DISCHARGE DATA     Farhan Drake MRN# [id number]   Age: 22 year old YOB: 2000     Date of Admission: 4/8/2023  Date of Discharge: April 12, 2023  Discharge Provider: Jae Patino MD       REASON FOR ADMISSION   Farhan is a 21 yo  male who was admitted with MDD and suicidal ideation. Upon meeting with pt this am he reports he is no longer suicidal attributing this behavior to his relapse on ETOH. Pt notes having a 3 day binge, he had been sober since November. Discussed medications often used for decreased cravings such as Campral, naltrexone, or Vivitrol. Pt declines offer to start on any medications. Pt admits to feelings of depression which began in November. Discussed treatment for his mood which he again declined. I did remind him we are available if he would like to discuss medication. Pt desires to return to treatment.       DISCHARGE DIAGNOSES   1.alcohol use disorder, severe  2.suicidal ideation  3.major depression     HOSPITAL COURSE   Patient was admitted to unit 5 due to the aforementioned presentation. The patient was placed under 15 minute checks to ensure patient safety. The patient participated in unit programming and groups as able.    Legal status during hospitalization was involuntary .Mr. Drake did not require seclusion/restraint during hospitalization.   He was dismissed at the resolution of his hold. Ultimately it was recommended that Mr. Drake transition to CD treatment as he has continued to ortiz alcohol use disorder since he was 14 years old age and his risk of relapse is high. When he does relapse, he gets worsening suicidal ideation and plans to kill himself. Ultimately, he wished to depart the hospital at the resolution of his hold. Additional days were offered to him, however he declined. He does not meet criteria to file for MICD commitment and thus he was discharged at the resolution of his  hold.  He was started on trazodone for sleep restoration which he found helpful and a 30-day prescritpion supply was sent with him upon dismissal. He received a plethora of CD resources during hospitalization and worked with social work closely for referrals in the future if needed.     We reviewed with Mr. Drake current and past medication trials including duration, dose, response and side effects. During this hospitalization, the following changes to the patient's psychotropic medications were made:     PTA psychotropic medications held:   none     PTA psychotropic medications continued/changed:   none     New medications initiated:   Trazodone 50 mg at bedtime  Recommended he start an selective serotonin reuptake inhibitor but he was unwilling and preferred to not.     Mr. Drake progressed gradually related to establishment of a supportive community network and abstaining from further substance use . By the time of discharge, his symptoms had improved somewhat.  Substance cravings improved with increased confidence in self-managing sobriety., Depression improved with increased confidence in ability to manage symptoms. and Suicidal ideation resolved with adequate outpatient plan in place.  The treatment goals (long-term goal/discharge criteria) were reached.     With the aforementioned changes and supports the patient noticed improvement in their symptoms and felt sufficiently ready for discharge. As a result, Farhna Drake was discharged. At the time of discharge, Farhan Drake was determined to not be a danger to self or others. The patient was also medically stable for discharge. At the current time of discharge, the patient does not meet criteria for involuntary hospitalization. On the day of discharge, the patient reports that they do not have suicidal or homicidal ideation. Steps taken to minimize risk include: assessing patient s behavior and thought process daily during hospital stay,  "discharging patient with adequate plan for follow up for mental and physical health and discussing safety plan of returning to the hospital should the patient ever have thoughts of harming themselves or others. Therefore, based on all available evidence including the factors cited above, the patient does not appear to be at imminent risk for self-harm, and is appropriate for outpatient level of care. The acute crisis is resolved and Farhan is discharging in improved condition. Though Farhan's acute crisis has resolved, there remains a higher risk of suicide over the long term compared to the general population. Factors that may be associated with relapse include worsening of depressive symptoms, alcohol use, illicit substance use, not taking medications, lack of mental health follow-up appointments and work/family/relationship stressors. Farhan Drake has a plan in place to mitigate these stressors, is hopeful about the future ,and is not assessed to be a imminent risk to self or anyone else and thus is not assessed to be holdable.  Farhan has received maximal benefit from the current hospital stay. Farhan Drake set to discharge.        DISCHARGE MEDICATIONS     Current Discharge Medication List      START taking these medications    Details   traZODone (DESYREL) 50 MG tablet Take 1 tablet (50 mg) by mouth nightly as needed for sleep (may repeat after 60 minutes)  Qty: 30 tablet, Refills: 0    Associated Diagnoses: Depression with suicidal ideation                VITALS   Vitals: /62   Pulse 62   Temp 97.6  F (36.4  C) (Temporal)   Resp 14   Ht 1.778 m (5' 10\")   Wt 108 kg (238 lb 1.6 oz)   SpO2 96%   BMI 34.16 kg/m       MENTAL STATUS EXAM   Appearance: Alert, oriented, dressed in hospital scrubs, appears stated age   Attitude: Cooperative   Eye Contact: Good  Mood: \"Better\"  Affect: Full range of affect  Speech: Normal rate and rhythm   Psychomotor Behavior: No tremor, rigidity, or " psychomotor abnormality   Thought Process: Logical, goal directed   Associations: No loose associations   Thought Content: Denies SI or plan. No SIB. Denies A/V hallucinations. No evidence of delusional thought.  Insight: Good  Judgment: Good  Oriented to: Person, place, and time  Attention Span and Concentration: Intact  Recent and Remote Memory: Intact  Language: English with appropriate syntax and vocabulary  Fund of Knowledge: Average  Muscle Strength and Tone: Grossly normal  Gait and Station: Grossly normal       DISCHARGE PLAN     1.  Education given regarding diagnostic and treatment options with risks, benefits and alternatives with adequate verbalization of understanding.  2.  Discharge to home. Upon detailed review of risk factors, patient amenable for release.   3.  Continue aforementioned medications and associated medication changes with follow-up by outpatient provider.  4.  Crisis management planning in place.    5.  Nursing and  to review further discharge recommendations.   6.  Active issues: No active medical issues requiring immediate follow-up care.  7.  Patient is being discharged with the following appointments as detailed below:    See AVS  Highly encouraged to enter CD treatment        DISCHARGE SERVICES PROVIDED     80 minutes spent on discharge services, including:  Final examination of patient.  Review and discussion of hospital stay.  Instructions for continued outpatient care/goals.  Preparation of discharge records.  Preparation of medications refills and new prescriptions.  Preparation of applicable referral forms.        ATTESTATION   Jae Patino MD  [facility]   Psychiatry    Video Visit: Patient has given verbal consent for video visit?: Yes  Type of Service: video visit for mental health treatment  Reason for Video Visit: COVID-19 [place]  Originating Site (patient location): [facility]  Distant Site (provider location):  Remote Location  Mode of Communication: Video Conference via [employer]  Time of Service: Date: April 12, 2023 , Start: 07:00 end: 08:00     LABS THIS ADMISSION     No results found for any visits on 04/08/23.

## 2023-04-12 VITALS
RESPIRATION RATE: 18 BRPM | SYSTOLIC BLOOD PRESSURE: 118 MMHG | HEART RATE: 73 BPM | DIASTOLIC BLOOD PRESSURE: 81 MMHG | TEMPERATURE: 98.6 F | WEIGHT: 238.1 LBS | OXYGEN SATURATION: 98 % | BODY MASS INDEX: 34.09 KG/M2 | HEIGHT: 70 IN

## 2023-04-12 PROCEDURE — 250N000013 HC RX MED GY IP 250 OP 250 PS 637: Performed by: NURSE PRACTITIONER

## 2023-04-12 PROCEDURE — 99239 HOSP IP/OBS DSCHRG MGMT >30: CPT | Mod: 95 | Performed by: PSYCHIATRY & NEUROLOGY

## 2023-04-12 RX ADMIN — GABAPENTIN 600 MG: 300 CAPSULE ORAL at 04:06

## 2023-04-12 RX ADMIN — CLONIDINE HYDROCHLORIDE 0.1 MG: 0.1 TABLET ORAL at 04:06

## 2023-04-12 ASSESSMENT — ACTIVITIES OF DAILY LIVING (ADL)
ADLS_ACUITY_SCORE: 28

## 2023-04-12 NOTE — PLAN OF CARE
Face to face report received from Cherelle BUSTILLO. Pt. Observed.     Problem: Adult Behavioral Health Plan of Care  Goal: Patient-Specific Goal (Individualization)  Description: Patient will sleep 6 to 8 hours per night  Patient will eat at least 50% of meals  Patient will attend at least 50% of groups  Patient will comply with recommendations of treatment team  Patient will remain medication compliant  Outcome: Progressing  Pt has been in bed with eyes closed and regular respirations for a total of 5 hours this noc shift. Pt. Reporting he is unable to fall back to sleep after being woke to take medications. Pt. Refusing morning scheduled medications. 15 minute and PRN checks all night. Will continue to monitor.     Face to face end of shift report communicated to alphonso BUSTILLO.     Hanane Kaminski RN  4/12/2023

## 2023-04-12 NOTE — PLAN OF CARE
"Face to face end of shift report communicated to Arturo TRIPATHI RN.     Lady Andres RN  4/11/2023  9:01 PM       Problem: Adult Behavioral Health Plan of Care  Goal: Patient-Specific Goal (Individualization)  Description: Patient will sleep 6 to 8 hours per night  Patient will eat at least 50% of meals  Patient will attend at least 50% of groups  Patient will comply with recommendations of treatment team  Patient will remain medication compliant  Outcome: Progressing  Note: Patient up on unit spending most of his time in lounge.  Patient polite and cooperative with nursing cares and assessment.    Patient lets needs be known.   PRN:  melatonin 3 mg and trazodone 50 mg @ 2018 for sleep.  Patient happy to be leaving facility, talks about how he needs to go home first to take care of bills and get things in order, endorses the plan to go to treatment.  \"I think if I work with treatment it will actually help me if they take me there\"    Patient does not attend groups this shift.   Patient denies SI/HI, AH/VH, depression and anxiety.      Goal Outcome Evaluation:                        "

## 2023-04-12 NOTE — PLAN OF CARE
Face to face shift report received from Hanane DURAN RN. Rounding completed, pt observed.    Problem: Adult Behavioral Health Plan of Care  Goal: Plan of Care Review  Outcome: Adequate for Care Transition  Note: Shift Summary:  Discharge Note    Patient Discharged to home on 4/12/2023 8:29 AM [facility] [employer] accompanied by .     Patient informed of discharge instructions in AVS. patient verbalizes understanding and denies having any questions pertaining to AVS. Patient stable at time of discharge. Patient denies SI, HI, and thoughts of self harm at time of discharge. All personal belongings returned to patient. Discharge prescriptions sent to [facility] but refuses to pick them up via electronic communication. Psych evaluation, history and physical, AVS, and discharge summary faxed to next level of care- .     Jaimee Mondragon RN  4/12/2023  8:29 AM      Goal: Patient-Specific Goal (Individualization)  Description: Patient will sleep 6 to 8 hours per night  Patient will eat at least 50% of meals  Patient will attend at least 50% of groups  Patient will comply with recommendations of treatment team  Patient will remain medication compliant  Outcome: Adequate for Care Transition  Goal: Individualized Daily Interaction Plan (IDIP)  Outcome: Adequate for Care Transition  Goal: Adheres to Safety Considerations for Self and Others  Outcome: Adequate for Care Transition  Goal: Absence of New-Onset Illness or Injury  Outcome: Adequate for Care Transition  Goal: Optimized Coping Skills in Response to Life Stressors  Outcome: Adequate for Care Transition  Goal: Develops/Participates in Therapeutic [place] to Support Successful Transition  Outcome: Adequate for Care Transition     Problem: Suicide Risk  Goal: Absence of Self-Harm  Description: Pt will be free of self harm and injury while on unit.   Outcome: Adequate for Care Transition     Problem: Alcohol Withdrawal  Goal: Alcohol Withdrawal Symptom Control  Description:  Patient will have a safe withdrawal from alcohol.  Outcome: Adequate for Care Transition  Goal: Optimal Neurologic Function  Outcome: Adequate for Care Transition  Goal: Readiness for Change Identified  Outcome: Adequate for Care Transition     Problem: Suicidal Behavior  Goal: Suicidal Behavior is Absent or Managed  Outcome: Adequate for Care Transition   Goal Outcome Evaluation:    Plan of Care Reviewed With: patient

## 2023-04-12 NOTE — PLAN OF CARE
Pt is discharging at the recommendation of the treatment team. Pt is discharging to home transported by [employer]. Pt denies having any thoughts of hurting themself or anyone else. Pt denies anxiety or depression. Pt has follow up with [facility]. Discharge instructions, including; demographic sheet, psychiatric evaluation, discharge summary, and AVS were faxed to these next level of care providers.

## 2023-04-18 ENCOUNTER — TELEPHONE (OUTPATIENT)
Dept: BEHAVIORAL HEALTH | Facility: HOSPITAL | Age: 23
End: 2023-04-18

## 2023-04-18 NOTE — TELEPHONE ENCOUNTER
Farhan was discharged to home on  4/12/23 from [facility]. Attempted to call Farhan to follow up ensuring all discharge questions were answered.  No answer.  Left voicemail with call back number.    Prescriptions were e-scribed at discharge and were able to be filled. No  If unable to obtain prescriptions: explain:  Farhan had medications filled at [facility], though refused to pick them up.

## 2024-04-09 ENCOUNTER — TRANSFERRED RECORDS (OUTPATIENT)
Dept: HEALTH INFORMATION MANAGEMENT | Facility: CLINIC | Age: 24
End: 2024-04-09
Payer: COMMERCIAL

## 2024-04-12 ENCOUNTER — HOSPITAL ENCOUNTER (EMERGENCY)
Facility: CLINIC | Age: 24
Discharge: HOME | End: 2024-04-12
Attending: EMERGENCY MEDICINE | Admitting: EMERGENCY MEDICINE
Payer: COMMERCIAL

## 2024-04-12 ENCOUNTER — APPOINTMENT (OUTPATIENT)
Dept: CT IMAGING | Facility: CLINIC | Age: 24
End: 2024-04-12
Attending: EMERGENCY MEDICINE
Payer: COMMERCIAL

## 2024-04-12 VITALS
SYSTOLIC BLOOD PRESSURE: 144 MMHG | RESPIRATION RATE: 20 BRPM | WEIGHT: 230 LBS | DIASTOLIC BLOOD PRESSURE: 110 MMHG | TEMPERATURE: 98.3 F | OXYGEN SATURATION: 96 % | HEART RATE: 62 BPM | BODY MASS INDEX: 32.93 KG/M2 | HEIGHT: 70 IN

## 2024-04-12 DIAGNOSIS — R07.9 CHEST PAIN, UNSPECIFIED TYPE: ICD-10-CM

## 2024-04-12 DIAGNOSIS — Z87.898 HISTORY OF ALCOHOL USE DISORDER: ICD-10-CM

## 2024-04-12 DIAGNOSIS — Z87.898 HISTORY OF SUBSTANCE USE DISORDER: ICD-10-CM

## 2024-04-12 LAB
ANION GAP SERPL CALCULATED.3IONS-SCNC: 13 MMOL/L (ref 7–15)
BASOPHILS # BLD AUTO: 0.1 10E3/UL (ref 0–0.2)
BASOPHILS NFR BLD AUTO: 1 %
BUN SERPL-MCNC: 12.2 MG/DL (ref 6–20)
CALCIUM SERPL-MCNC: 8.9 MG/DL (ref 8.6–10)
CHLORIDE SERPL-SCNC: 98 MMOL/L (ref 98–107)
CREAT SERPL-MCNC: 0.88 MG/DL (ref 0.67–1.17)
D DIMER PPP FEU-MCNC: 0.52 UG/ML FEU (ref 0–0.5)
DEPRECATED HCO3 PLAS-SCNC: 23 MMOL/L (ref 22–29)
EGFRCR SERPLBLD CKD-EPI 2021: >90 ML/MIN/1.73M2
EOSINOPHIL # BLD AUTO: 0.4 10E3/UL (ref 0–0.7)
EOSINOPHIL NFR BLD AUTO: 5 %
ERYTHROCYTE [DISTWIDTH] IN BLOOD BY AUTOMATED COUNT: 16.3 % (ref 10–15)
GLUCOSE SERPL-MCNC: 94 MG/DL (ref 70–99)
HCT VFR BLD AUTO: 47.1 % (ref 40–53)
HGB BLD-MCNC: 15.7 G/DL (ref 13.3–17.7)
HOLD SPECIMEN: NORMAL
IMM GRANULOCYTES # BLD: 0 10E3/UL
IMM GRANULOCYTES NFR BLD: 0 %
LYMPHOCYTES # BLD AUTO: 1.7 10E3/UL (ref 0.8–5.3)
LYMPHOCYTES NFR BLD AUTO: 20 %
MCH RBC QN AUTO: 29.1 PG (ref 26.5–33)
MCHC RBC AUTO-ENTMCNC: 33.3 G/DL (ref 31.5–36.5)
MCV RBC AUTO: 87 FL (ref 78–100)
MONOCYTES # BLD AUTO: 0.6 10E3/UL (ref 0–1.3)
MONOCYTES NFR BLD AUTO: 7 %
NEUTROPHILS # BLD AUTO: 5.5 10E3/UL (ref 1.6–8.3)
NEUTROPHILS NFR BLD AUTO: 66 %
NRBC # BLD AUTO: 0 10E3/UL
NRBC BLD AUTO-RTO: 0 /100
PLATELET # BLD AUTO: 208 10E3/UL (ref 150–450)
POTASSIUM SERPL-SCNC: 4 MMOL/L (ref 3.4–5.3)
RBC # BLD AUTO: 5.39 10E6/UL (ref 4.4–5.9)
SODIUM SERPL-SCNC: 134 MMOL/L (ref 135–145)
TROPONIN T SERPL HS-MCNC: 9 NG/L
WBC # BLD AUTO: 8.3 10E3/UL (ref 4–11)

## 2024-04-12 PROCEDURE — 99285 EMERGENCY DEPT VISIT HI MDM: CPT | Mod: 25 | Performed by: EMERGENCY MEDICINE

## 2024-04-12 PROCEDURE — 99284 EMERGENCY DEPT VISIT MOD MDM: CPT | Mod: 25 | Performed by: EMERGENCY MEDICINE

## 2024-04-12 PROCEDURE — 93308 TTE F-UP OR LMTD: CPT | Mod: TC | Performed by: EMERGENCY MEDICINE

## 2024-04-12 PROCEDURE — 82374 ASSAY BLOOD CARBON DIOXIDE: CPT | Performed by: EMERGENCY MEDICINE

## 2024-04-12 PROCEDURE — 250N000011 HC RX IP 250 OP 636: Performed by: EMERGENCY MEDICINE

## 2024-04-12 PROCEDURE — 85379 FIBRIN DEGRADATION QUANT: CPT | Performed by: EMERGENCY MEDICINE

## 2024-04-12 PROCEDURE — 93005 ELECTROCARDIOGRAM TRACING: CPT | Performed by: EMERGENCY MEDICINE

## 2024-04-12 PROCEDURE — 36415 COLL VENOUS BLD VENIPUNCTURE: CPT | Performed by: EMERGENCY MEDICINE

## 2024-04-12 PROCEDURE — 96374 THER/PROPH/DIAG INJ IV PUSH: CPT | Mod: 59 | Performed by: EMERGENCY MEDICINE

## 2024-04-12 PROCEDURE — 96361 HYDRATE IV INFUSION ADD-ON: CPT | Performed by: EMERGENCY MEDICINE

## 2024-04-12 PROCEDURE — 250N000009 HC RX 250: Performed by: EMERGENCY MEDICINE

## 2024-04-12 PROCEDURE — 84484 ASSAY OF TROPONIN QUANT: CPT | Performed by: EMERGENCY MEDICINE

## 2024-04-12 PROCEDURE — 85041 AUTOMATED RBC COUNT: CPT | Performed by: EMERGENCY MEDICINE

## 2024-04-12 PROCEDURE — 93010 ELECTROCARDIOGRAM REPORT: CPT | Performed by: EMERGENCY MEDICINE

## 2024-04-12 PROCEDURE — 71275 CT ANGIOGRAPHY CHEST: CPT

## 2024-04-12 PROCEDURE — 93308 TTE F-UP OR LMTD: CPT | Mod: 26 | Performed by: EMERGENCY MEDICINE

## 2024-04-12 PROCEDURE — 258N000003 HC RX IP 258 OP 636: Performed by: EMERGENCY MEDICINE

## 2024-04-12 RX ORDER — KETOROLAC TROMETHAMINE 15 MG/ML
10 INJECTION, SOLUTION INTRAMUSCULAR; INTRAVENOUS
Status: COMPLETED | OUTPATIENT
Start: 2024-04-12 | End: 2024-04-12

## 2024-04-12 RX ORDER — SODIUM CHLORIDE, SODIUM LACTATE, POTASSIUM CHLORIDE, CALCIUM CHLORIDE 600; 310; 30; 20 MG/100ML; MG/100ML; MG/100ML; MG/100ML
1000 INJECTION, SOLUTION INTRAVENOUS CONTINUOUS
Status: DISCONTINUED | OUTPATIENT
Start: 2024-04-12 | End: 2024-04-12 | Stop reason: HOSPADM

## 2024-04-12 RX ORDER — IOPAMIDOL 755 MG/ML
91 INJECTION, SOLUTION INTRAVASCULAR ONCE
Status: COMPLETED | OUTPATIENT
Start: 2024-04-12 | End: 2024-04-12

## 2024-04-12 RX ADMIN — SODIUM CHLORIDE 100 ML: 9 INJECTION, SOLUTION INTRAVENOUS at 16:48

## 2024-04-12 RX ADMIN — SODIUM CHLORIDE, POTASSIUM CHLORIDE, SODIUM LACTATE AND CALCIUM CHLORIDE 1000 ML: 600; 310; 30; 20 INJECTION, SOLUTION INTRAVENOUS at 14:47

## 2024-04-12 RX ADMIN — IOPAMIDOL 91 ML: 755 INJECTION, SOLUTION INTRAVENOUS at 16:48

## 2024-04-12 RX ADMIN — KETOROLAC TROMETHAMINE 10 MG: 15 INJECTION, SOLUTION INTRAMUSCULAR; INTRAVENOUS at 14:46

## 2024-04-12 ASSESSMENT — COLUMBIA-SUICIDE SEVERITY RATING SCALE - C-SSRS
2. HAVE YOU ACTUALLY HAD ANY THOUGHTS OF KILLING YOURSELF IN THE PAST MONTH?: NO
6. HAVE YOU EVER DONE ANYTHING, STARTED TO DO ANYTHING, OR PREPARED TO DO ANYTHING TO END YOUR LIFE?: NO
1. IN THE PAST MONTH, HAVE YOU WISHED YOU WERE DEAD OR WISHED YOU COULD GO TO SLEEP AND NOT WAKE UP?: NO

## 2024-04-12 ASSESSMENT — ENCOUNTER SYMPTOMS
CONSTITUTIONAL NEGATIVE: 1
NEUROLOGICAL NEGATIVE: 1
HEMATOLOGIC/LYMPHATIC NEGATIVE: 1
RESPIRATORY NEGATIVE: 1
EYES NEGATIVE: 1
MUSCULOSKELETAL NEGATIVE: 1
ENDOCRINE NEGATIVE: 1
PSYCHIATRIC NEGATIVE: 1
GASTROINTESTINAL NEGATIVE: 1
ALLERGIC/IMMUNOLOGIC NEGATIVE: 1

## 2024-04-12 ASSESSMENT — ACTIVITIES OF DAILY LIVING (ADL)
ADLS_ACUITY_SCORE: 35

## 2024-04-12 NOTE — DISCHARGE INSTRUCTIONS
1)Your evaluation today did not reveal the cause of your chest pain and discomfort as reported.  Imaging did not show any evidence for an emergency diagnosis or condition.  After receiving IV fluids he reported feeling much improved and comfortable continue treatment.    2) We have reviewed that if there are new concerns including palpitations, fainting or any new concerns you should return to be reevaluated.

## 2024-04-12 NOTE — ED PROVIDER NOTES
History     Chief Complaint   Patient presents with    Chest Pain     from [facility] for CP, dyspnea, 138/97 HR 74 NSR, 98% on RA, 12 lead negative, 324 ASA and 0.4 nitro sublingual given. Has been at [facility] for 3 days     HPI  Farhan Drake is a 23 year old male who presents by EMS with report of substernal chest pain rating down both arms.  Pain has been present for the last 3 days.  Pain is pleuritic in nature.  EMS administered aspirin and nitroglycerin sublingually.  Reviewed the medical records office visit on 3/7/24-sleep concerns. Hospitalized on 4/8/23 for mental health reasons.    On examination patient [facility] [place].  Patient reports he is currently in treatment at [facility] for alcohol use disorder.  His last drink was about 3 days ago.  He also admits to snorting methamphetamine about 5 days ago.  He is on propranolol for history of hypertension.  Patient reports pleuritic left-sided chest pain that does not radiate.  He also reports no cough or fever.  No rash about the chest no recent fall or trauma    Allergies:  Allergies   Allergen Reactions    Latex        Problem List:    Patient Active Problem List    Diagnosis Date Noted    Depression with suicidal ideation 04/08/2023     Priority: Medium        Past Medical History:    No past medical history on file.    Past Surgical History:    No past surgical history on file.    Family History:    No family history on file.    Social History:  Marital Status:  Single [1]        Medications:    propranolol (INDERAL) 20 MG tablet  traZODone (DESYREL) 50 MG tablet          Review of Systems   Constitutional: Negative.    HENT: Negative.     Eyes: Negative.    Respiratory: Negative.     Cardiovascular:  Positive for chest pain.   Gastrointestinal: Negative.    Endocrine: Negative.    Genitourinary: Negative.    Musculoskeletal: Negative.    Skin: Negative.    Allergic/Immunologic: Negative.    Neurological:  "Negative.    Hematological: Negative.    Psychiatric/Behavioral: Negative.     All other systems reviewed and are negative.      Physical Exam   BP: (!) 141/100  Pulse: 69  Temp: 98.3  F (36.8  C)  Resp: 16  Height: 177.8 cm (5' 10\")  Weight: 104.3 kg (230 lb)  SpO2: 97 %      Physical Exam  Constitutional:       Appearance: He is well-developed. He is not toxic-appearing or diaphoretic.   HENT:      Head: Normocephalic and atraumatic.   Cardiovascular:      Rate and Rhythm: Normal rate and regular rhythm.      Heart sounds: Normal heart sounds.   Pulmonary:      Effort: Pulmonary effort is normal.      Breath sounds: Normal breath sounds.   Chest:      Chest wall: No mass, deformity, tenderness, crepitus or edema. There is no dullness to percussion.   Abdominal:      Palpations: Abdomen is soft.   Musculoskeletal:      Right lower leg: No tenderness. No edema.      Left lower leg: No tenderness. No edema.   Skin:     Capillary Refill: Capillary refill takes less than 2 seconds.      Coloration: Skin is not cyanotic or pale.      Findings: No ecchymosis, erythema or rash.      Nails: There is no clubbing.   Neurological:      General: No focal deficit present.      Mental Status: He is alert.   Psychiatric:         Mood and Affect: Mood normal. Mood is not anxious.         Behavior: Behavior normal. Behavior is not agitated.         ED Course        Procedures    Results for orders placed during the hospital encounter of 04/12/24    POC US ECHO LIMITED    [facility] Procedure Note    Limited Bedside ED Cardiac Ultrasound:    PROCEDURE: PERFORMED BY: Dr. Rudolph Espino MD  INDICATIONS/SYMPTOM:  Chest Pain  PROBE: Cardiac phased array probe  BODY LOCATION: Chest  FINDINGS:  The ultrasound was performed utilizing the parasternal long axis and parasternal short axis views.  Cardiac contractility:  Present  Gross estimation of cardiac kinesis: normal  Pericardial Effusion:  None  RV:LV ratio: " LV > RV  IVC:  Diameter:  IVC diameter expiration (IVCe) not measured  IVC diameter inspiration (IVCi) not measured.  Collapsibility:  IVC collapses < 50% with inspiration  INTERPRETATION:    Chamber size and motion were grossly normal with LV > RV, normal cardiac kinesis.  No pericardial effusion was found.  IVC visualized and findings indicate normovolemia.  IMAGE DOCUMENTATION: Images were archived to PACs system.            EKG Interpretation:      Interpreted by Rudolph Espino MD  Time reviewed: 1406  Symptoms at time of EKG: Pleuritic chest pain   Rhythm: normal sinus   Rate: Normal  Axis: Normal  Ectopy: none  Conduction: normal  ST Segments/ T Waves: No ST-T wave changes  Q Waves: nonspecific  Comparison to prior: No old viewable EKG for comparison    Clinical Impression: no acute changes        Critical Care time:  none             ED medications:  Medications   lactated ringers infusion 1,000 mL (has no administration in time range)   ketorolac (TORADOL) injection 10 mg (10 mg Intravenous $Given 4/12/24 1446)   lactated ringers BOLUS 1,000 mL (1,000 mLs Intravenous $New Bag 4/12/24 1447)   iopamidol (ISOVUE-370) solution 91 mL (91 mLs Intravenous $Given 4/12/24 1648)   sodium chloride 0.9 % bag 500mL for CT scan flush use (100 mLs Intravenous $Given 4/12/24 1648)       ED Vitals:  Vitals:    04/12/24 1335 04/12/24 1401 04/12/24 1406 04/12/24 1436   BP:  (!) 125/92 (!) 149/110 (!) 144/110   Pulse:  58 64 62   Resp:  14 16 20   Temp: 98.3  F (36.8  C)      TempSrc: Oral      SpO2:  95% 97% 96%   Weight:       Height:            ED labs and imaging:  Results for orders placed or performed during the hospital encounter of 04/12/24 (from the past 24 hour(s))   [place] Draw    Narrative    The following orders were created for panel order [place] Draw.  Procedure                               Abnormality         Status                     ---------                               -----------         ------                      Extra Blue Top Tube[[id number]]                              Final result               Extra Red Top Tube[[id number]]                               Final result               Extra Green Top (Lithium...[[id number]]                      Final result               Extra Purple Top Tube[[id number]]                            Final result                 Please view results for these tests on the individual orders.   Extra Blue Top Tube   Result Value Ref Range    Hold Specimen JIC    Extra Red Top Tube   Result Value Ref Range    Hold Specimen JIC    Extra Green Top (Lithium Heparin) Tube   Result Value Ref Range    Hold Specimen JIC    Extra Purple Top Tube   Result Value Ref Range    Hold Specimen JIC    CBC with platelets differential    Narrative    The following orders were created for panel order CBC with platelets differential.  Procedure                               Abnormality         Status                     ---------                               -----------         ------                     CBC with platelets and d...[[id number]]  Abnormal            Final result                 Please view results for these tests on the individual orders.   Basic metabolic panel   Result Value Ref Range    Sodium 134 (L) 135 - 145 mmol/L    Potassium 4.0 3.4 - 5.3 mmol/L    Chloride 98 98 - 107 mmol/L    Carbon Dioxide (CO2) 23 22 - 29 mmol/L    Anion Gap 13 7 - 15 mmol/L    Urea Nitrogen 12.2 6.0 - 20.0 mg/dL    Creatinine 0.88 0.67 - 1.17 mg/dL    GFR Estimate >90 >60 mL/min/1.73m2    Calcium 8.9 8.6 - 10.0 mg/dL    Glucose 94 70 - 99 mg/dL   Troponin T, High Sensitivity   Result Value Ref Range    Troponin T, High Sensitivity 9 <=22 ng/L   D dimer quantitative   Result Value Ref Range    D-Dimer Quantitative 0.52 (H) 0.00 - 0.50 ug/mL FEU    Narrative    This D-dimer assay is intended for use in conjunction with a clinical pretest probability assessment model to exclude pulmonary embolism (PE) and  deep venous thrombosis (DVT) in outpatients suspected of PE or DVT. The cut-off value is 0.50 ug/mL FEU.   CBC with platelets and differential   Result Value Ref Range    WBC Count 8.3 4.0 - 11.0 10e3/uL    RBC Count 5.39 4.40 - 5.90 10e6/uL    Hemoglobin 15.7 13.3 - 17.7 g/dL    Hematocrit 47.1 40.0 - 53.0 %    MCV 87 78 - 100 fL    MCH 29.1 26.5 - 33.0 pg    MCHC 33.3 31.5 - 36.5 g/dL    RDW 16.3 (H) 10.0 - 15.0 %    Platelet Count 208 150 - 450 10e3/uL    % Neutrophils 66 %    % Lymphocytes 20 %    % Monocytes 7 %    % Eosinophils 5 %    % Basophils 1 %    % Immature Granulocytes 0 %    NRBCs per 100 WBC 0 <1 /100    Absolute Neutrophils 5.5 1.6 - 8.3 10e3/uL    Absolute Lymphocytes 1.7 0.8 - 5.3 10e3/uL    Absolute Monocytes 0.6 0.0 - 1.3 10e3/uL    Absolute Eosinophils 0.4 0.0 - 0.7 10e3/uL    Absolute Basophils 0.1 0.0 - 0.2 10e3/uL    Absolute Immature Granulocytes 0.0 <=0.4 10e3/uL    Absolute NRBCs 0.0 10e3/uL   POC US ECHO LIMITED    [facility] Procedure Note      Limited Bedside ED Cardiac Ultrasound:    PROCEDURE: PERFORMED BY: Dr. Rudolph Espino MD  INDICATIONS/SYMPTOM:  Chest Pain  PROBE: Cardiac phased array probe  BODY LOCATION: Chest  FINDINGS:   The ultrasound was performed utilizing the parasternal long axis and parasternal short axis views.  Cardiac contractility:  Present  Gross estimation of cardiac kinesis: normal  Pericardial Effusion:  None  RV:LV ratio: LV > RV  IVC:    Diameter:  IVC diameter expiration (IVCe) not measured                                                   IVC diameter inspiration (IVCi) not measured.                                                    Collapsibility:  IVC collapses < 50% with inspiration  INTERPRETATION:    Chamber size and motion were grossly normal with LV > RV, normal cardiac kinesis.  No pericardial effusion was found.  IVC visualized and findings indicate normovolemia.  IMAGE DOCUMENTATION: Images were archived to PACs  system.        CT Chest Pulmonary Embolism w Contrast    Narrative    EXAM: CT CHEST PULMONARY EMBOLISM W CONTRAST  LOCATION: [facility]  DATE: 4/12/2024    INDICATION: Pleuritic chest pain x 3 days. Hx of JUDITH and AUD, HTN. Evaluate for pulmonary embolism versus other acute cardiopulmonary process  COMPARISON: None.  TECHNIQUE: CT chest pulmonary angiogram during arterial phase injection of IV contrast. Multiplanar reformats and MIP reconstructions were performed. Dose reduction techniques were used.   CONTRAST: 91 mL Isovue 370    FINDINGS:  ANGIOGRAM CHEST: No pulmonary embolism. Nonaneurysmal aorta, though suboptimal opacification for dissection evaluation.    LUNGS AND PLEURA: Mild motion artifact. Mild airway thickening. No consolidation. No pleural effusion or pneumothorax.    MEDIASTINUM/AXILLAE: No adenopathy. Normal heart size. No pericardial effusion.    CORONARY ARTERY CALCIFICATION: None.    UPPER ABDOMEN: Mild hepatic steatosis.    MUSCULOSKELETAL: Normal.      Impression    IMPRESSION:    1.  No pulmonary embolism.    2.  Mild airway thickening; correlate for airways disease / bronchitis. No pneumonia.    3.  Mild hepatic steatosis.             Assessments & Plan (with Medical Decision Making)   Assessment Summary and clinical impression: 23-year-old male presented with 3-day history of substernal chest pain rating down both arms.  He described the pain as pleuritic in nature EMS providers administered aspirin and sublingual nitroglycerin x 1.  Patient's been a resident at [facility] for the last 3 days for treatment for alcohol use disorder.  Patient reports history of substance use disorder with methamphetamine.  Last noted about 3 days ago.  No fever or chills.  No known trauma.  On propranolol for hypertension. Patient arrived afebrile.  97% on room air.  Blood pressure was 141/100.  Point-of-care bedside echocardiogram revealed evidence for pericardial effusion with  normal gross biventricular function workup revealed a mildly elevated D-dimer with normal troponin.  Given history of hypertension atypical chest pain and known history of substance use disorder and alcohol use disorder advanced imaging of chest with contrast obtained and revealed no acute findings.  He was discharged with chest pain of uncertain cause with plan to continue to monitor symptoms and return if there is new concerns including palpitations or syncope.    ED course and plan:  Reviewed the medical record.  EKG on arrival revealed normal sinus rhythm with T wave inversion in lead III, no acute ST changes or arrhythmia.  There is no pedal EKG for comparison workup initiated on arrival revealed a mildly elevated D-dimer 0.52.  Patient's age reviewed imaging to exclude other cardiopulmonary process with reassuring initial workup including troponin and point-of-care echocardiogram.  CT chest with contrast PE protocol revealed no acute cardiopulmonary findings see details outlined in the radiology report. With reassuring imaging and cardiopulmonary workup he was discharged with chest pain of uncertain cause with plan for outpatient follow-up and low threshold for care.       Disclaimer: This note consists of symbols derived from keyboarding, dictation and/or voice recognition software. As a result, there may be errors in the script that have gone undetected. Please consider this when interpreting information found in this chart.   I have reviewed the nursing notes.    I have reviewed the findings, diagnosis, plan and need for follow up with the patient.           Medical Decision Making  The patient's presentation was of high complexity (substernal chest discomfort).    The patient's evaluation involved:  ordering and/or review of 2 test(s) in this encounter (diagnostic imaging and labs)    The patient's management necessitated moderate risk (prescription drug management including medications given in the  ED).        New Prescriptions    No medications on file       Final diagnoses:   Chest pain, unspecified type   History of alcohol use disorder   History of substance use disorder       4/12/2024   [facility] EMERGENCY DEPT       Rudolph Espino MD  04/12/24 2010     36.6

## 2024-04-12 NOTE — ED TRIAGE NOTES
CP x 3 days, worse today. Pain described as pressure. Pain radiates down arms. Pain worse with deep inhalation. EMS gave  mg and nitroglycerin SL tablet 0.4.      Triage Assessment (Adult)       Row Name 04/12/24 1332          Triage Assessment    Airway WDL WDL        Respiratory WDL    Respiratory WDL X;rhythm/pattern     Rhythm/Pattern, Respiratory dyspnea upon exertion        Skin Circulation/Temperature WDL    Skin Circulation/Temperature WDL WDL        Cardiac WDL    Cardiac WDL X;chest pain        Chest Pain Assessment    Chest Pain Location anterior chest, left     Chest Pain Radiation arm     Character pressure     Duration 3 days     Associated Signs/Symptoms dyspnea;hypertension     Chest Pain Intervention cardiac biomarkers drawn;cardiac monitoring continued;cardiac monitor placed;12-lead ECG obtained;activity minimized        Peripheral/Neurovascular WDL    Peripheral Neurovascular WDL WDL        Cognitive/Neuro/Behavioral WDL    Cognitive/Neuro/Behavioral WDL X;mood/behavior  drowsy